# Patient Record
Sex: MALE | Employment: FULL TIME | URBAN - METROPOLITAN AREA
[De-identification: names, ages, dates, MRNs, and addresses within clinical notes are randomized per-mention and may not be internally consistent; named-entity substitution may affect disease eponyms.]

---

## 2019-05-09 ENCOUNTER — OFFICE VISIT (OUTPATIENT)
Dept: ORTHOPEDIC SURGERY | Age: 39
End: 2019-05-09

## 2019-05-09 ENCOUNTER — DOCUMENTATION ONLY (OUTPATIENT)
Dept: ORTHOPEDIC SURGERY | Age: 39
End: 2019-05-09

## 2019-05-09 VITALS
WEIGHT: 231.8 LBS | RESPIRATION RATE: 13 BRPM | OXYGEN SATURATION: 100 % | TEMPERATURE: 97 F | HEART RATE: 61 BPM | SYSTOLIC BLOOD PRESSURE: 137 MMHG | DIASTOLIC BLOOD PRESSURE: 85 MMHG | HEIGHT: 71 IN | BODY MASS INDEX: 32.45 KG/M2

## 2019-05-09 DIAGNOSIS — M22.42 CHONDROMALACIA OF PATELLOFEMORAL JOINT, LEFT: Primary | ICD-10-CM

## 2019-05-09 DIAGNOSIS — M22.41 CHONDROMALACIA OF PATELLOFEMORAL JOINT, RIGHT: ICD-10-CM

## 2019-05-09 RX ORDER — ERGOCALCIFEROL 1.25 MG/1
50000 CAPSULE ORAL
COMMUNITY

## 2019-05-09 RX ORDER — MELOXICAM 15 MG/1
15 TABLET ORAL
Qty: 30 TAB | Refills: 1 | Status: SHIPPED | OUTPATIENT
Start: 2019-05-09

## 2019-05-09 NOTE — LETTER
NOTIFICATION RETURN TO WORK / SCHOOL 
 
5/9/2019 9:51 AM 
 
[address] Ramu Bradford [address] [address] To Whom It May Concern: 
 
Omid Rivera is currently under the care of [address] [place] [address] Faraz Solis. He will return to work with the following restrictions: no running x 4 weeks. Work restrictions will be re-evaluated at his next follow up appointment. If there are questions or concerns please have the patient contact our office. Sincerely, Robbie Taylor MD

## 2019-05-09 NOTE — PROGRESS NOTES
Curtis Bath III 
1980 Chief Complaint Patient presents with  Knee Pain SEVERIANO KNEE PAIN  
  
 
HISTORY OF PRESENT ILLNESS Dhiraj Shell is a 41 y.o. male who presents today for evaluation of b/l knee pain. L>R. He rates his pain 9/10 today. Pain has been present for several months. He states he went for a run and then both of his knees swelled up. He states his heel and toes feel numb. He notes he cannot workout like he would like to. Patient describes the pain as aching and dull that is Constant in nature. Symptoms are worse with walking and standing , Activity and is better with  Rest. Associated symptoms include Swelling. Since problem started, it: has worsened. Pain does not wake patient up at night. Has taken no meds for the problem. Has tried following treatments: Injections:NO; Brace:NO; Therapy:NO; Cane/Crutch:NO No Known Allergies History reviewed. No pertinent past medical history. Social History Socioeconomic History  Marital status: UNKNOWN Spouse name: Not on file  Number of children: Not on file  Years of education: Not on file  Highest education level: Not on file Occupational History  Not on file Social Needs  Financial resource strain: Not on file  Food insecurity:  
  Worry: Not on file Inability: Not on file  Transportation needs:  
  Medical: Not on file Non-medical: Not on file Tobacco Use  Smoking status: Never Smoker  Smokeless tobacco: Never Used Substance and Sexual Activity  Alcohol use: Yes  Drug use: No  
 Sexual activity: Not on file Lifestyle  Physical activity:  
  Days per week: Not on file Minutes per session: Not on file  Stress: Not on file Relationships  Social connections:  
  Talks on phone: Not on file Gets together: Not on file Attends Islam service: Not on file Active member of club or organization: Not on file Attends meetings of clubs or organizations: Not on file Relationship status: Not on file  Intimate partner violence:  
  Fear of current or ex partner: Not on file Emotionally abused: Not on file Physically abused: Not on file Forced sexual activity: Not on file Other Topics Concern  Not on file Social History Narrative  Not on file History reviewed. No pertinent surgical history. Family History Problem Relation Age of Onset  Diabetes Father Current Outpatient Medications Medication Sig  ergocalciferol (VITAMIN D2) 50,000 unit capsule Take 50,000 Units by mouth. No current facility-administered medications for this visit. REVIEW OF SYSTEM Patient denies: Weight loss, Fever/Chills, HA, Visual changes, Fatigue, Chest pain, SOB, Abdominal pain, N/V/D/C, Blood in stool or urine, Edema. Pertinent positive as above in HPI. All others were negative PHYSICAL EXAM:  
Visit Vitals /85 Pulse 61 Temp 97 °F (36.1 °C) (Oral) Resp 13 Ht 5' 11\" (1.803 m) Wt 231 lb 12.8 oz (105.1 kg) SpO2 100% BMI 32.33 kg/m² The patient is a well-developed, well-nourished male   in no acute distress. The patient is alert and oriented times three. The patient is alert and oriented times three. Mood and affect are normal. 
LYMPHATIC: lymph nodes are not enlarged and are within normal limits SKIN: normal in color and non tender to palpation. There are no bruises or abrasions noted. NEUROLOGICAL: Motor sensory exam is within normal limits. Reflexes are equal bilaterally. There is normal sensation to pinprick and light touch MUSCULOSKELETAL: 
Examination Left knee Right knee Skin Intact Intact Range of motion 0-130 0-130 Effusion - - Medial joint line tenderness - - Lateral joint line tenderness - - Tenderness Pes Bursa - - Tenderness insertion MCL - - Tenderness insertion LCL - - Vineets - - Patella crepitus - -  
 Patella grind + + Lachman - - Pivot shift - - Anterior drawer - - Posterior drawer - - Varus stress - -  
Valgus stress - - Neurovascular Intact Intact Calf Swelling and Tenderness to Palpation - - Christy's Test - -  
Hamstring Cord Tightness + + IMAGING: XR of b/l knee dated 4/16/19 was reviewed and read:  
IMPRESSION: 
No significant abnormality identified. IMPRESSION:   
  ICD-10-CM ICD-9-CM 1. Chondromalacia of patellofemoral joint, left M22.42 717.7 REFERRAL TO PHYSICAL THERAPY  
   meloxicam (MOBIC) 15 mg tablet 2. Chondromalacia of patellofemoral joint, right M22.41 717.7 REFERRAL TO PHYSICAL THERAPY  
   meloxicam (MOBIC) 15 mg tablet PLAN: 
1. The patient presents today with b/l knee pain due to patellofemoral chondromalacia and I would like him to begin PT. Risk factors include: n/a 2. No ultrasound exam indicated today 3. No cortisone injection indicated today 4. Yes Physical Therapy indicated today 5. No diagnostic test indicated today: 6. No durable medical equipment indicated today 7. No referral indicated today 8. Yes medications indicated today: MOBIC 9. No Narcotic indicated today RTC 4 weeks if pain continues Scribed by Pasha Cary ([address]) as dictated by Saeed Marion MD 
 
I, Dr. Saeed Marion, confirm that all documentation is accurate.  
 
Saeed Marion M.D.  
[facility]

## 2019-05-09 NOTE — PROGRESS NOTES
1. Have you been to the ER, urgent care clinic since your last visit? Hospitalized since your last visit? No 
 
2. Have you seen or consulted any other health care providers outside of the [address] [place] [address] since your last visit? Include any pap smears or colon screening.  No

## 2019-05-09 NOTE — PROGRESS NOTES
Patient came to [facility] location for an appointment with  [facility]. Patient was instructed to drop off form at  to be completed. Patient states they need a detailed office note stating information that is listed on the front and the back of the document he dropped off at the . Each of these areas need to be addressed in the office note. Patient requests that the office note has all of this information listed and contact him when he can come to the office to sign a release form and  the note. Patient can be reached at [phone]. Form has been placed into the forms bin. Patient is aware of 7-10 business day completion time.

## 2019-05-16 ENCOUNTER — HOSPITAL ENCOUNTER (OUTPATIENT)
Dept: PHYSICAL THERAPY | Age: 39
End: 2019-05-16
Payer: COMMERCIAL

## 2019-05-21 ENCOUNTER — HOSPITAL ENCOUNTER (OUTPATIENT)
Dept: PHYSICAL THERAPY | Age: 39
Discharge: HOME | End: 2019-05-21
Payer: COMMERCIAL

## 2019-05-21 PROCEDURE — 97162 PT EVAL MOD COMPLEX 30 MIN: CPT

## 2019-05-21 PROCEDURE — 97110 THERAPEUTIC EXERCISES: CPT

## 2019-05-21 NOTE — PROGRESS NOTES
[facility] at the [address], [place], [address] [facility]  Phone: [phone]      Fax:  [phone]  PLAN OF CARE / [address] [place] [address] PHYSICAL THERAPY SERVICES  Patient Name: Evan Vergara III : 1980   Medical   Diagnosis: Pain in right knee [M25.561]  Left knee pain [M25.562] Treatment Diagnosis: Pain in right knee [M25.561]  Left knee pain [M25.562]   Onset Date: chronic     Referral Source: Jone Samayoa [place] [facility]): 2019   Prior Hospitalization: See medical history Provider #: [id number]   Prior Level of Function: Some pain with running   Comorbidities: none   Medications: Verified on Patient Summary List   The Plan of Care and following information is based on the information from the initial evaluation.   ========================================================================  Assessment / key information:  Patient is a 41 y.o. M who presents to [facility] with a dx of B knee pain s/p recent onset of increased exercise. Symptoms are worsened by running and alleviated by rest.  Average reported pain levels are 5/10, 9/10 at worst, and 2/10a t best. Imaging \"negative\" per patient. Pt would benefit from skilled PT to address her objective and functional limitations to improve ROM, strength, posture, and decrease pain to improve his ability to stand for prolonged at work/home.        PROM Accessory    R  L   Patellar  Hyper  Hyper    tibifemoral  wfl  wfl            ROM    R  L    Knee flex 130 130    Knee ext 0 deg  1 deg    Ankle DF  25 deg   25   Hip Flex wFL wFL   Hip IR/ER  WFL wfl       MMT    Right  Left   Hip flexion 5/5 5   Hip abd/add 5/5 5   Knee ext  5 5   Bridge  Difficulty with low height bridging         ST  Poor squatting mechanics   Whipping with gait mechanics   (-) ligamentous                 ========================================================================  Eval Complexity: History: LOW Complexity : Zero comorbidities / personal factors that will impact the outcome / POCExam:HIGH Complexity : 4+ Standardized tests and measures addressing body structure, function, activity limitation and / or participation in recreation  Presentation: MEDIUM Complexity : Evolving with changing characteristics  Clinical Decision Making:MEDIUM Complexity : FOTO score of 26-74Overall Complexity:MEDIUM  Problem List: pain affecting function, decrease ROM, decrease strength, impaired gait/ balance, decrease ADL/ functional abilitiies and decrease activity tolerance   Treatment Plan may include any combination of the following: Therapeutic exercise, Therapeutic activities, Neuromuscular re-education, Physical agent/modality, Gait/balance training, Manual therapy and Patient education  Patient / Family readiness to learn indicated by: asking questions and interest  Persons(s) to be included in education: patient (P)  Barriers to Learning/Limitations: None  Measures taken:    Patient Goal (s): \"walk normally\"   Patient self reported health status: good  Rehabilitation Potential: good   Short Term Goals: To be accomplished in  3  weeks:  Short term goals   1. Patient to be (I) and compliant with Initial HEP to prevent further disability. Eval: bridge, Sit to stand. 2. Patient to improve FOTO scores by 7 points to improve functional tolerance. Eval: 50/100       Long term Goals  1. Pt to be (I) and compliant with progressive HEP in preparation for D/C. Eval: n.a     2. Pt to increase running tolerance to a mile and half in 12 minutes to improve functional tolerance. Eval: pain with running     3. Pt to perform 10 full height single leg bridges to full height. Eval: moderat height bridging.  Long Term Goals:  To be accomplished in  6  weeks:    Frequency / Duration:   Patient to be seen  2-3  times per week for 4-6  weeks:  Patient / Caregiver education and instruction: activity modification and exercises  Therapist Signature: Danisha Rojas, PT Date: 5/21/2019     Time: 4:43 PM   ========================================================================  I certify that the above Physical Therapy Services are being furnished while the patient is under my care. I agree with the treatment plan and certify that this therapy is necessary. Physician Signature:        Date:       Time:   Please sign and return to [facility]  or you may fax the signed copy [phone] . Thank you.

## 2019-05-21 NOTE — PROGRESS NOTES
PT DAILY TREATMENT NOTE    Patient Name: Huong Urbano III  Date:2019  : 1980  [x]  Patient  Verified  Payor: Anna Jayme / Plan: Karo MORALES / Product Type: Commerical /    In time:4:40  Out time:5:30  Total Treatment Time (min):50   Total Timed Codes (min): 25  1:1 Treatment Time ( [address] only): 25  Visit #: 1 of 5    Treatment Area: Pain in right knee [M25.561]  Left knee pain [M25.562]    SUBJECTIVE  Pain Level (0-10 scale):0/10   Any medication changes, allergies to medications, adverse drug reactions, diagnosis change, or new procedure performed?: [x] No    [] Yes (see summary sheet for update)  Subjective functional status/changes:   [] No changes reported  Patient is a 41 y.o. M who presents to [facility] with a dx of B knee pain s/p recent onset of increased exercise. Symptoms are worsened by running and alleviated by rest.  Average reported pain levels are 5/10, 9/10 at worst, and 2/10a t best. Imaging \"negative\" per patient. Pt would benefit from skilled PT to address her objective and functional limitations to improve ROM, strength, posture, and decrease pain to improve his ability to stand for prolonged at work/home. OBJECTIVE    Modalities Rationale:     decrease edema, decrease inflammation and decrease pain to improve patient's ability to stand for prolonged periods.     min [] Estim, type/location:                                      []  att     []  unatt     []  w/US     []  w/ice    []  w/heat    min []  Mechanical Traction: type/lbs                   []  pro   []  sup   []  int   []  cont    []  before manual    []  after manual    min []  Ultrasound, settings/location:      min []  Iontophoresis w/ dexamethasone, location:                                               []  take home patch       []  in clinic   5 min [x]  Ice     []  Heat    location/position: Seated L knee     min []  Vasopneumatic Device, press/temp:     min []  Other:    [] Skin assessment post-treatment (if applicable):    []  intact    []  redness- no adverse reaction     []redness  adverse reaction:        20 min []Eval                  []Re-Eval       25 min Therapeutic Exercise:  [] See flow sheet :   Rationale: increase ROM and increase strength to improve the patients ability to stand for prolonged periods. With   [x] TE   [] TA   [] neuro   [] other: Patient Education: [x] Review HEP    [] Progressed/Changed HEP based on:   [] positioning   [] body mechanics   [] transfers   [] heat/ice application    [] other:      Other Objective/Functional Measures:       PROM Accessory     R  L   Patellar  Hyper  Hyper    tibifemoral  wfl  wfl                 ROM     R  L    Knee flex 130 130    Knee ext 0 deg  1 deg    Ankle DF  25 deg   25   Hip Flex wFL wFL   Hip IR/ER  WFL wfl         MMT     Right  Left   Hip flexion 5/5 5   Hip abd/add 5/5 5   Knee ext  5 5   Bridge  Difficulty with low height bridging            ST  Poor squatting mechanics   Whipping with gait mechanics   (-) ligamentous        Pain Level (0-10 scale) post treatment:1/10     ASSESSMENT/Changes in Function: Pt has signs and symptoms consistent with mechanical knee pain. Patient will continue to benefit from skilled PT services to modify and progress therapeutic interventions, address functional mobility deficits, address ROM deficits and address strength deficits to attain remaining goals. []  See Plan of Care  []  See progress note/recertification  []  See Discharge Summary         Progress towards goals / Updated goals: · Short Term Goals: To be accomplished in  3  weeks:  Short term goals   1. Patient to be (I) and compliant with Initial HEP to prevent further disability. Eval: bridge, Sit to stand.      2. Patient to improve FOTO scores by 7 points to improve functional tolerance.   Eval: 50/100         Long term Goals  1. Pt to be (I) and compliant with progressive HEP in preparation for D/C.    Eval: n.a    2. Pt to increase running tolerance to a mile and half in 12 minutes to improve functional tolerance. Eval: pain with running      3. Pt to perform 10 full height single leg bridges to full height.    Eval: moderate height bridging.        PLAN  []  Upgrade activities as tolerated     []  Continue plan of care  []  Update interventions per flow sheet       []  Discharge due to:_  []  Other:_      Wes Clements, PT 5/21/2019  6:56 PM    Future Appointments   Date Time Provider [place]   6/4/2019  3:00 PM Jazmin PURCELL [facility]   6/6/2019  8:40 AM MD Kaveh Azevedo   6/6/2019 12:45 PM ALYX Fajardo [facility]   6/10/2019  4:15 PM ALYX Fajardo [facility]   6/17/2019  4:15 PM ALYX Fajardo [facility]

## 2019-06-04 ENCOUNTER — HOSPITAL ENCOUNTER (OUTPATIENT)
Dept: PHYSICAL THERAPY | Age: 39
Discharge: HOME | End: 2019-06-04
Payer: COMMERCIAL

## 2019-06-04 PROCEDURE — 97530 THERAPEUTIC ACTIVITIES: CPT

## 2019-06-04 PROCEDURE — 97110 THERAPEUTIC EXERCISES: CPT

## 2019-06-04 NOTE — PROGRESS NOTES
PT DAILY TREATMENT NOTE    Patient Name: Rashi Escalante III  Date:2019  : 1980  [x]  Patient  Verified  Payor: Richard Bright / Plan: Joanne Ruff RPN / Product Type: Commerical /    In time:3:15  Out time:4:15  Total Treatment Time (min): 60  Total Timed Codes (min): 60  1:1 Treatment Time ( [address] only): 60   Visit #: 2 of 5    Treatment Area: Pain in right knee [M25.561]  Left knee pain [M25.562]    SUBJECTIVE  Pain Level (0-10 scale): 3  Any medication changes, allergies to medications, adverse drug reactions, diagnosis change, or new procedure performed?: [x] No    [] Yes (see summary sheet for update)  Subjective functional status/changes:   [] No changes reported  \"they are alright Not as bad as last week. Ivan Lopez \"     OBJECTIVE      45 min Therapeutic Exercise:  [x] See flow sheet :   Rationale: increase ROM, increase strength, improve coordination and improve balance to improve the patients ability to perform pain free ADL\"s   15 min Therapeutic Activity:  [x]  See flow sheet :   Rationale: increase ROM, increase strength and improve coordination  to improve the patients ability to perform pain free ADLs     With   [x] TE   [] TA   [] neuro   [] other: Patient Education: [x] Review HEP    [] Progressed/Changed HEP based on:   [] positioning   [] body mechanics   [] transfers   [] heat/ice application    [] other:      Other Objective/Functional Measures:   Pain with step ups on left  Very weak and inhibited glute max       Pain Level (0-10 scale) post treatment: 3    ASSESSMENT/Changes in Function: Pt reported continued pain with prolonged standing and sit to stand. Noted lateral left knee pain with descent of step downs . Improved with band around knee with medial pull. Pt was challenged with single leg bridges. Noted deficits in gluts and Left adductor strength with positioning . Unable to perform sit to stand wihtout pain. Focused on PPT with squatting.      Patient will continue to benefit from skilled PT services to modify and progress therapeutic interventions, address functional mobility deficits, address ROM deficits, address strength deficits, analyze and address soft tissue restrictions, analyze and cue movement patterns, analyze and modify body mechanics/ergonomics, assess and modify postural abnormalities, address imbalance/dizziness and instruct in home and community integration to attain remaining goals. []  See Plan of Care  []  See progress note/recertification  []  See Discharge Summary         Progress towards goals / Updated goals: · Short Term Goals: To be accomplished in  3  weeks:  Short term goals   1. Patient to be (I) and compliant with Initial HEP to prevent further disability.   Eval: bridge, Sit to stand.   Current: Reviewed , reported pain with sit to stand. Added clams and standing hip ABD      2. Patient to improve FOTO scores by 7 points to improve functional tolerance.   Eval: 50/100   Current : reassess at visit 5         Long term Goals  1. Pt to be (I) and compliant with progressive HEP in preparation for D/C.   Eval: n.a   Current: Reviewed and updated , progressing       2. Pt to increase running tolerance to a mile and half in 12 minutes to improve functional tolerance.   Eval: pain with running  Current:      3. Pt to perform 10 full height single leg bridges to full height.    Eval: moderate height bridging.   Current:       PLAN  [x]  Upgrade activities as tolerated     [x]  Continue plan of care  []  Update interventions per flow sheet       []  Discharge due to:_  []  Other:_      Carolina Ramos PTA 6/4/2019  3:11 PM    Future Appointments   Date Time Provider [place]   6/6/2019 12:45 PM Ej Saleh, PT JAZZ[facility]   6/10/2019  4:15 PM Ej Saleh, PT [facility]   6/17/2019  4:15 PM Ej Saleh, PT [facility]   6/18/2019  1:00 PM [facility]

## 2019-06-06 ENCOUNTER — HOSPITAL ENCOUNTER (OUTPATIENT)
Dept: PHYSICAL THERAPY | Age: 39
Discharge: HOME | End: 2019-06-06
Payer: COMMERCIAL

## 2019-06-06 PROCEDURE — 97110 THERAPEUTIC EXERCISES: CPT | Performed by: PHYSICAL THERAPIST

## 2019-06-06 PROCEDURE — 97530 THERAPEUTIC ACTIVITIES: CPT | Performed by: PHYSICAL THERAPIST

## 2019-06-06 NOTE — PROGRESS NOTES
PT DAILY TREATMENT NOTE    Patient Name: Coleen Lilly III  Date:2019  : 1980  [x]  Patient  Verified  Payor: Katie Gandhi / Plan: Afshan Brown RPN / Product Type: Commerical /    In time:1248  Out time:1400  Total Treatment Time (min): 72     Visit #: 3 of 5    Treatment Area: Pain in right knee [M25.561]  Left knee pain [M25.562]    SUBJECTIVE  Pain Level (0-10 scale): 0  Any medication changes, allergies to medications, adverse drug reactions, diagnosis change, or new procedure performed?: [x] No    [] Yes (see summary sheet for update)  Subjective functional status/changes:   [x] No changes reported      OBJECTIVE    47 min Therapeutic Exercise:  [x] See flow sheet :   Rationale: increase ROM, increase strength, improve coordination and improve balance to improve the patients ability to perform pain free ADL\"s       25 min Therapeutic Activity:  [x]  See flow sheet : pt education on running technique , symmetry of motion self assessment and repeated mobility ex for sitting trunk rotation , single leg sit to stand and sitting leg raise  , body mechanics    Rationale: increase ROM, increase strength and improve coordination  to improve the patients ability to perform pain free running             With   [] TE   [] TA   [] neuro   [] other: Patient Education: [x] Review HEP    [] Progressed/Changed HEP based on:   [] positioning   [] body mechanics   [] transfers   [] heat/ice application    [] other:      Other Objective/Functional Measures: recheck flexibility : noted tight IT band mild on right and quad R>L , HS SLR 90 deg bilaterally, tight soleous bilaterally     = leg length      Pain Level (0-10 scale) post treatment: 0     ASSESSMENT/Changes in Function: pt progressing well with exercises given handouts of ex stretches and repeated mobility ex with TMR (total motion release) technique, pt referred to pose running technique for patient education.       Patient will continue to benefit from skilled PT services to modify and progress therapeutic interventions, address functional mobility deficits, address ROM deficits, address strength deficits, analyze and address soft tissue restrictions, analyze and cue movement patterns, analyze and modify body mechanics/ergonomics, assess and modify postural abnormalities and address imbalance/dizziness to attain remaining goals. [x]  See Plan of Care  []  See progress note/recertification  []  See Discharge Summary         Progress towards goals / Updated goals: · Short Term Goals: To be accomplished in  3  weeks:  Short term goals   1. Patient to be (I) and compliant with Initial HEP to prevent further disability.   Eval: bridge, Sit to stand.   Current: Reviewed , given additional handouts on stretches and repeated mobility ex      2. Patient to improve FOTO scores by 7 points to improve functional tolerance.   Eval: 50/100   Current : reassess at visit 5      Long term Goals  1. Pt to be (I) and compliant with progressive HEP in preparation for D/C.   Eval: n.a   Current: pt compliant , Reviewed and updated , progressing       2. Pt to increase running tolerance to a mile and half in 12 minutes to improve functional tolerance.   Eval: pain with running  Current: currently no running discussed running progression starting with 2 mile walk without pain then 1/4 mile walk/run to 2 miles.      3. Pt to perform 10 full height single leg bridges to full height.    Eval: moderate height bridging.   Current: progressing     PLAN  [x]  Upgrade activities as tolerated     [x]  Continue plan of care  []  Update interventions per flow sheet       []  Discharge due to:_  []  Other:_      [place] PT 6/6/2019  1:15 PM    Future Appointments   Date Time Provider [place]   6/17/2019  4:15 PM ALYX Vigil [facility]   6/18/2019  1:00 PM MD Kaveh Reid   6/19/2019  3:00 PM ALYX Vigil [facility]

## 2019-06-07 ENCOUNTER — DOCUMENTATION ONLY (OUTPATIENT)
Dept: ORTHOPEDIC SURGERY | Age: 39
End: 2019-06-07

## 2019-06-07 NOTE — PROGRESS NOTES
Patient dropped off [employer]- fitness profiles document. He needs documentation from our office stating he is not able to run 1.5 miles in 12 mins because of his knees. He need to have how long it will take for him to get back to being able to run. Patient states he can do push ups and sit ups but cannot do high impact. Patient states this needs to be documented with how long he is to abstain from high impact.

## 2019-06-10 ENCOUNTER — APPOINTMENT (OUTPATIENT)
Dept: PHYSICAL THERAPY | Age: 39
End: 2019-06-10
Payer: COMMERCIAL

## 2019-06-17 ENCOUNTER — HOSPITAL ENCOUNTER (OUTPATIENT)
Dept: PHYSICAL THERAPY | Age: 39
Discharge: HOME | End: 2019-06-17
Payer: COMMERCIAL

## 2019-06-17 PROCEDURE — 97110 THERAPEUTIC EXERCISES: CPT

## 2019-06-17 NOTE — PROGRESS NOTES
PT DAILY TREATMENT NOTE    Patient Name: Chuck Edgar III  Date:2019  : 1980  [x]  Patient  Verified  Payor: Temo Anderson / Plan: [address] RPN / Product Type: Commerical /    In time:4:30 Out time:5:15  Total Treatment Time (min): 45     Visit #: 4 of 5    Treatment Area: Pain in right knee [M25.561]  Left knee pain [M25.562]    SUBJECTIVE  Pain Level (0-10 scale): 0  Any medication changes, allergies to medications, adverse drug reactions, diagnosis change, or new procedure performed?: [x] No    [] Yes (see summary sheet for update)  Subjective functional status/changes:   [] No changes reported  I see my doctor tomorrow. OBJECTIVE    45 min Therapeutic Exercise:  [x] See flow sheet :   Rationale: increase ROM, increase strength, improve coordination and improve balance to improve the patients ability to perform pain free ADL\"s           With   [] TE   [] TA   [] neuro   [] other: Patient Education: [x] Review HEP    [] Progressed/Changed HEP based on:   [] positioning   [] body mechanics   [] transfers   [] heat/ice application    [] other:      Other Objective/Functional Measures:Pt has improved squatting mechanics and progressed to single leg bridges. Pain Level (0-10 scale) post treatment: 0     ASSESSMENT/Changes in Function: Pt has improved squatting mechanics and improved glute control. Patient will continue to benefit from skilled PT services to modify and progress therapeutic interventions, address functional mobility deficits, address ROM deficits, address strength deficits, analyze and address soft tissue restrictions, analyze and cue movement patterns, analyze and modify body mechanics/ergonomics, assess and modify postural abnormalities and address imbalance/dizziness to attain remaining goals. [x]  See Plan of Care  []  See progress note/recertification  []  See Discharge Summary         Progress towards goals / Updated goals:   · Short Term Goals: To be accomplished in  3  weeks:  Short term goals   1. Patient to be (I) and compliant with Initial HEP to prevent further disability.   Eval: bridge, Sit to stand.   Current:MET.      2. Patient to improve FOTO scores by 7 points to improve functional tolerance.   Eval: 50/100   Current : pt reports improved functional status, and has began running in therapy, progressing.      Long term Goals  1. Pt to be (I) and compliant with progressive HEP in preparation for D/C.   Eval: n.a   Current: MET      2. Pt to increase running tolerance to a mile and half in 12 minutes to improve functional tolerance.   Eval: pain with running  Current: began running today during therapy, progressing.      3. Pt to perform 10 full height single leg bridges to full height. Eval: moderate height bridging.    Current: progressing, challenged with single leg bridging.      PLAN   [x]  Upgrade activities as tolerated     [x]  Continue plan of care  []  Update interventions per flow sheet       []  Discharge due to:_  []  Other:_      Avel Bender, ALYX 6/17/2019  1:15 PM    Future Appointments   Date Time Provider [place]   6/18/2019  1:00 PM MD Kaveh Valles   6/19/2019  3:00 PM Marilu Jama, PT [facility]

## 2019-06-17 NOTE — PROGRESS NOTES
[facility] at the [address], [place], [address] [facility]  Phone: [phone]      Fax:  [phone]    PROGRESS NOTE  Patient Name: Armando Kathleen III : 1980   Treatment/Medical Diagnosis: Pain in right knee [M25.561]  Left knee pain [M25.562]   Referral Source: Kellie Mcardle,*     Date of Initial Visit: 19 Attended Visits: 4 Missed Visits: 0     SUMMARY OF TREATMENT  Pt has improved squatting mechanics. Pt reports improvement in symptoms and has began running today in therapy. therex for strengthening, there act for functional tolerance, neuro re-ed for coordination, manual therapy to improve ROM and soft tissue mobility, and patient education for long-term management. CURRENT STATUS  Short term goals   1. Patient to be (I) and compliant with Initial HEP to prevent further disability.   Eval: bridge, Sit to stand.   Current:MET.      2. Patient to improve FOTO scores by 7 points to improve functional tolerance.   Eval: 50/100   Current : pt reports improved functional status, and has began running in therapy, progressing.      Long term Goals  1. Pt to be (I) and compliant with progressive HEP in preparation for D/C.   Eval: n.a   Current: MET      2. Pt to increase running tolerance to a mile and half in 12 minutes to improve functional tolerance.   Eval: pain with running  Current: began running today during therapy, progressing.      3. Pt to perform 10 full height single leg bridges to full height. Eval: moderate height bridging.    Current: progressing, challenged with single leg bridging. RECOMMENDATIONS  Pt to continue skilled PT for 2x/4 weeks secondary to progression towards established goals. If you have any questions/comments please contact us directly at [phone]  Thank you for allowing us to assist in the care of your patient.   Therapist Signature: Paco Gore PT Date: 2019     Time: 6:00 PM   NOTE TO PHYSICIAN:  PLEASE COMPLETE THE ORDERS BELOW AND FAX TO   [facility] . If you are unable to process this request in 24 hours please contact our office:[phone]    ___ I have read the above report and request that my patient continue as recommended.   ___ I have read the above report and request that my patient continue therapy with the following changes/special instructions:_________________________________________________________   ___ I have read the above report and request that my patient be discharged from therapy.      Physician Signature:        Date:       Time:

## 2019-06-18 ENCOUNTER — APPOINTMENT (OUTPATIENT)
Dept: PHYSICAL THERAPY | Age: 39
End: 2019-06-18
Payer: COMMERCIAL

## 2019-06-18 ENCOUNTER — OFFICE VISIT (OUTPATIENT)
Dept: ORTHOPEDIC SURGERY | Age: 39
End: 2019-06-18

## 2019-06-18 VITALS
HEIGHT: 71 IN | OXYGEN SATURATION: 99 % | SYSTOLIC BLOOD PRESSURE: 140 MMHG | TEMPERATURE: 98.1 F | HEART RATE: 72 BPM | DIASTOLIC BLOOD PRESSURE: 81 MMHG | WEIGHT: 233 LBS | BODY MASS INDEX: 32.62 KG/M2

## 2019-06-18 DIAGNOSIS — M22.42 CHONDROMALACIA OF PATELLOFEMORAL JOINT, LEFT: Primary | ICD-10-CM

## 2019-06-18 NOTE — PROGRESS NOTES
Jhony Hough III  1980   Chief Complaint   Patient presents with    Knee Pain     left, 4 days of PT        HISTORY OF PRESENT ILLNESS  Jhony Hough III is a 41 y.o. male who presents today for reevaluation of left knee pain. Patient rates pain as 0/10 today. Pain has been present for several months. He states he went for a run and then both of his knees swelled up. He states his heel and toes feel numb. He notes he cannot workout like he would like to. Pt has been to 4 days of PT with some relief. 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 Patient denies any fever, chills, chest pain, shortness of breath or calf pain. The remainder of the review of systems is negative. There are no new illness or injuries to report since last seen in the office. There are no changes to medications, allergies, family or social history. PHYSICAL EXAM:   Visit Vitals  /81 (BP 1 Location: Right arm, BP Patient Position: Sitting)   Pulse 72   Temp 98.1 °F (36.7 °C) (Oral)   Ht 5' 11\" (1.803 m)   Wt 233 lb (105.7 kg)   SpO2 99%   BMI 32.50 kg/m²     The patient is a well-developed, well-nourished male   in no acute distress. The patient is alert and oriented times three. The patient is alert and oriented times three. Mood and affect are normal.  LYMPHATIC: lymph nodes are not enlarged and are within normal limits  SKIN: normal in color and non tender to palpation. There are no bruises or abrasions noted. NEUROLOGICAL: Motor sensory exam is within normal limits. Reflexes are equal bilaterally.  There is normal sensation to pinprick and light touch  MUSCULOSKELETAL:  Examination Left knee Right knee   Skin Intact Intact   Range of motion 0-130 0-130   Effusion - -   Medial joint line tenderness - -   Lateral joint line tenderness - -   Tenderness Pes Bursa - -   Tenderness insertion MCL - -   Tenderness insertion LCL - -   Vineets - -   Patella crepitus - -   Patella grind + +   Lachman - -   Pivot shift - -   Anterior drawer - -   Posterior drawer - -   Varus stress - -   Valgus stress - -   Neurovascular Intact Intact   Calf Swelling and Tenderness to Palpation - -   Christy's Test - -   Hamstring Cord Tightness + +     IMAGING: XR of b/l knee dated 4/16/19 was reviewed and read:   IMPRESSION:  No significant abnormality identified. IMPRESSION:      ICD-10-CM ICD-9-CM    1. Chondromalacia of patellofemoral joint, left M22.42 717.7       PLAN:   1. Pt presents today with left knee pain due to chondromalacia and was given a note today with restrictions: low impact activities which will prevent him from running for next 4 months. Cleared to do pushups,situps. Risk factors include: n/a  2. No ultrasound exam indicated today  3. No cortisone injection indicated today   4. No Physical/Occupational Therapy indicated today  5. No diagnostic test indicated today:   6. No durable medical equipment indicated today  7. No referral indicated today   8. No medications indicated today:   9. No Narcotic indicated today       RTC prn      Scribed by [address]) as dictated by Renetta Quiroz MD    I, Dr. Renetta Quiroz, confirm that all documentation is accurate.     Renetta Quiroz M.D.   [facility]

## 2019-06-18 NOTE — LETTER
NOTIFICATION RETURN TO WORK / SCHOOL 
 
6/18/2019 1:35 PM 
 
Mr. Brenda Lozoya [address] [facility] [address] To Whom It May Concern: 
 
Elizabeth Marie is currently under the care of [address] [place] [address] Faraz Solis. He is cleared for low impact activities, which will prevent him from running for next 4 months. Cleared to do pushups and situps. If there are questions or concerns please have the patient contact our office. Sincerely, Annamaria Crabtree MD

## 2019-06-19 ENCOUNTER — HOSPITAL ENCOUNTER (OUTPATIENT)
Dept: PHYSICAL THERAPY | Age: 39
Discharge: HOME | End: 2019-06-19
Payer: COMMERCIAL

## 2019-06-19 PROCEDURE — 97110 THERAPEUTIC EXERCISES: CPT

## 2019-06-19 PROCEDURE — 97140 MANUAL THERAPY 1/> REGIONS: CPT

## 2019-06-19 NOTE — PROGRESS NOTES
PT DAILY TREATMENT NOTE    Patient Name: Андрей Rodriguez III  Date:2019  : 1980  [x]  Patient  Verified  Payor: [address] [place] [address] Road / Plan: Fannie Tan RPN / Product Type: Commerical /    In time:3:25  Out time:4:25  Total Treatment Time (min): 60     Visit #: 5 of 5    Treatment Area: Pain in right knee [M25.561]  Left knee pain [M25.562]    SUBJECTIVE  Pain Level (0-10 scale): 0  Any medication changes, allergies to medications, adverse drug reactions, diagnosis change, or new procedure performed?: [x] No    [] Yes (see summary sheet for update)  Subjective functional status/changes:   [] No changes reported  My doctor said no high impact activities. OBJECTIVE    50 min Therapeutic Exercise:  [x] See flow sheet :   Rationale: increase ROM, increase strength, improve coordination and improve balance to improve the patients ability to perform pain free ADL\"s     10 min Manual Therapy:  STM HS   Rationale: decrease pain, increase ROM and increase tissue extensibility to stand for prolonged periods. With   [] TE   [] TA   [] neuro   [] other: Patient Education: [x] Review HEP    [] Progressed/Changed HEP based on:   [] positioning   [] body mechanics   [] transfers   [] heat/ice application    [] other:      Other Objective/Functional Measures: Pt has dificulty with low squatting. Pt still has deficits in HS strength. Pt has gait deviations during running. Pain Level (0-10 scale) post treatment: 0     ASSESSMENT/Changes in Function:   Pt has dificulty with low squatting. Pt still has deficits in HS strength. Pt has gait deviations during running.        Patient will continue to benefit from skilled PT services to modify and progress therapeutic interventions, address functional mobility deficits, address ROM deficits, address strength deficits, analyze and address soft tissue restrictions, analyze and cue movement patterns, analyze and modify body mechanics/ergonomics, assess and modify postural abnormalities and address imbalance/dizziness to attain remaining goals. [x]  See Plan of Care  []  See progress note/recertification  []  See Discharge Summary         Progress towards goals / Updated goals: · Short Term Goals: To be accomplished in  3  weeks:  Short term goals   1. Patient to be (I) and compliant with Initial HEP to prevent further disability.   Eval: bridge, Sit to stand.   Current:MET.      2. Patient to improve FOTO scores by 7 points to improve functional tolerance.   Eval: 50/100   Current : pt reports improved functional status, and has began running in therapy, progressing.      Long term Goals  1. Pt to be (I) and compliant with progressive HEP in preparation for D/C.   Eval: n.a   Current: MET      2. Pt to increase running tolerance to a mile and half in 12 minutes to improve functional tolerance.   Eval: pain with running  Current: began running today during therapy, progressing.      3. Pt to perform 10 full height single leg bridges to full height. Eval: moderate height bridging.    Current: progressing, challenged with single leg bridging.      PLAN   [x]  Upgrade activities as tolerated     [x]  Continue plan of care  []  Update interventions per flow sheet       []  Discharge due to:_  []  Other:_      Bernardino Gonzales, PT 6/19/2019  1:15 PM    Future Appointments   Date Time Provider [place]   6/19/2019  3:00 PM Malka Pelaez, PT [facility]

## 2019-09-04 ENCOUNTER — HOSPITAL ENCOUNTER (OUTPATIENT)
Dept: PHYSICAL THERAPY | Age: 39
Discharge: HOME | End: 2019-09-04
Payer: COMMERCIAL

## 2019-09-04 PROCEDURE — 97110 THERAPEUTIC EXERCISES: CPT

## 2019-09-04 NOTE — PROGRESS NOTES
PT DAILY TREATMENT NOTE    Patient Name: Frederick Abdalla III  Date:2019  : 1980  [x]  Patient  Verified  Payor: Yosi Carrasquillo / Plan: Aki Macias RPN / Product Type: Commerical /    In time:10:49  Out time:11:40  Total Treatment Time (min): 51     Visit #: 6 of 10    Treatment Area: Pain in right knee [M25.561]  Left knee pain [M25.562]    SUBJECTIVE  Pain Level (0-10 scale): 0  Any medication changes, allergies to medications, adverse drug reactions, diagnosis change, or new procedure performed?: [x] No    [] Yes (see summary sheet for update)  Subjective functional status/changes:   [] No changes reported  My doctor said no high impact activities. OBJECTIVE    51 min Therapeutic Exercise:  [x] See flow sheet :   Rationale: increase ROM, increase strength, improve coordination and improve balance to improve the patients ability to perform pain free ADL\"s             With   [] TE   [] TA   [] neuro   [] other: Patient Education: [x] Review HEP    [] Progressed/Changed HEP based on:   [] positioning   [] body mechanics   [] transfers   [] heat/ice application    [] other:      Other Objective/Functional Measures: Pt has improved squatting mechanics. Pain Level (0-10 scale) post treatment: 0     ASSESSMENT/Changes in Function:   Pt has returned to PT after 2 month break secondary to [employer] obligations. Pt has deficits in HS strength, decreased medial/lateral stability during dynamic activities. Pt very challenged with eccentric hamstring, quad, and single leg balance. Pt challenged with end range TKE and is still unable to run at this time secondary to MD recommendations. Pt is improving and is compliant with HEP. Pt to benefit from continued skilled PT to prevent injury and safe return to running.        Patient will continue to benefit from skilled PT services to modify and progress therapeutic interventions, address functional mobility deficits, address ROM deficits, address strength deficits, analyze and address soft tissue restrictions, analyze and cue movement patterns, analyze and modify body mechanics/ergonomics, assess and modify postural abnormalities and address imbalance/dizziness to attain remaining goals. [x]  See Plan of Care  []  See progress note/recertification  []  See Discharge Summary         Progress towards goals / Updated goals: · Short Term Goals: To be accomplished in  3  weeks:  Short term goals   1. Patient to be (I) and compliant with Initial HEP to prevent further disability.   Eval: bridge, Sit to stand.   Current:MET.      2. Patient to improve FOTO scores by 7 points to improve functional tolerance.   Eval: 50/100   Last PN : pt reports improved functional status, and has began running in therapy   Current: Pt unable to run per MD, progressing. Long term Goals  1. Pt to be (I) and compliant with progressive HEP in preparation for D/C.   Eval: n.a   Current: MET      2. Pt to increase running tolerance to a mile and half in 12 minutes to improve functional tolerance.   Eval: pain with running  Current: began running today during therapy, progressing.      3. Pt to perform 10 full height single leg bridges to full height. Eval: moderate height bridging.    Last PNt: challenged with single leg bridging. Current: challenged but improved single leg bridging, progressing. PLAN   [x]  Upgrade activities as tolerated     [x]  Continue plan of care  []  Update interventions per flow sheet       []  Discharge due to:_  []  Other:_      Sloan Mitchell, PT 9/4/2019  1:15 PM    No future appointments.

## 2019-09-04 NOTE — PROGRESS NOTES
[facility] at the [address], [place], [address] [facility] [address]  Phone: [phone]      Fax:  [phone]    PROGRESS NOTE  Patient Name: Jamal Segundo III : 1980   Treatment/Medical Diagnosis: Pain in right knee [M25.561]  Left knee pain [M25.562]   Referral Source: Brian Lopez,*     Date of Initial Visit: 19 Attended Visits: 6 Missed Visits: 0     SUMMARY OF TREATMENT  Pt has returned to PT after 2 month break secondary to [employer] obligations. Pt has deficits in HS strength, decreased medial/lateral stability during dynamic activities. Pt very challenged with eccentric hamstring, quad, and single leg balance. Pt challenged with end range TKE and is still unable to run at this time secondary to MD recommendations. Pt is improving and is compliant with HEP. Pt to benefit from continued skilled PT to prevent injury and safe return to running    therex for strengthening, there act for functional tolerance, neuro re-ed for coordination, manual therapy to improve ROM and soft tissue mobility, and patient education for long-term management. CURRENT STATUS  · Short Term Goals: To be accomplished in  3  weeks:  Short term goals   1. Patient to be (I) and compliant with Initial HEP to prevent further disability.   Eval: bridge, Sit to stand.   Current:MET.      2. Patient to improve FOTO scores by 7 points to improve functional tolerance.   Eval: 50/100   Last PN : pt reports improved functional status, and has began running in therapy   Current: Pt unable to run per MD, progressing. Long term Goals  1. Pt to be (I) and compliant with progressive HEP in preparation for D/C.   Eval: n.a   Current: MET      2. Pt to increase running tolerance to a mile and half in 12 minutes to improve functional tolerance.   Eval: pain with running  Current: began running today during therapy, progressing.      3.  Pt to perform 10 full height single leg bridges to full height. Eval: moderate height bridging.    Last PNt: challenged with single leg bridging. Current: challenged but improved single leg bridging, progressing      RECOMMENDATIONS  Pt to continue skilled PT for 5 visits secondary to progression towards established goals. If you have any questions/comments please contact us directly at [phone]  Thank you for allowing us to assist in the care of your patient. Therapist Signature: Florencia Mathew PT Date: 9/4/2019     Time: 11:59 AM   NOTE TO PHYSICIAN:  PLEASE COMPLETE THE ORDERS BELOW AND FAX TO   [facility] . If you are unable to process this request in 24 hours please contact our office:[phone]    ___ I have read the above report and request that my patient continue as recommended.   ___ I have read the above report and request that my patient continue therapy with the following changes/special instructions:_________________________________________________________   ___ I have read the above report and request that my patient be discharged from therapy.      Physician Signature:        Date:       Time:

## 2019-09-04 NOTE — PROGRESS NOTES
PT DAILY TREATMENT NOTE    Patient Name: Ashley Lucero III  Date:2019  : 1980  [x]  Patient  Verified  Payor: Pamela Chandler / Plan: Bud MORALES / Product Type: Commerical /    In time:3:25  Out time:4:25  Total Treatment Time (min): 60     Visit #: 6 of 10    Treatment Area: Pain in right knee [M25.561]  Left knee pain [M25.562]    SUBJECTIVE  Pain Level (0-10 scale): 0  Any medication changes, allergies to medications, adverse drug reactions, diagnosis change, or new procedure performed?: [x] No    [] Yes (see summary sheet for update)  Subjective functional status/changes:   [] No changes reported  My doctor said no high impact activities. OBJECTIVE    50 min Therapeutic Exercise:  [x] See flow sheet :   Rationale: increase ROM, increase strength, improve coordination and improve balance to improve the patients ability to perform pain free ADL\"s     10 min Manual Therapy:  STM HS   Rationale: decrease pain, increase ROM and increase tissue extensibility to stand for prolonged periods. With   [] TE   [] TA   [] neuro   [] other: Patient Education: [x] Review HEP    [] Progressed/Changed HEP based on:   [] positioning   [] body mechanics   [] transfers   [] heat/ice application    [] other:      Other Objective/Functional Measures: Pt has dificulty with low squatting. Pt still has deficits in HS strength. Pt has gait deviations during running. Pain Level (0-10 scale) post treatment: 0     ASSESSMENT/Changes in Function:   Pt has dificulty with low squatting. Pt still has deficits in HS strength. Pt has gait deviations during running.        Patient will continue to benefit from skilled PT services to modify and progress therapeutic interventions, address functional mobility deficits, address ROM deficits, address strength deficits, analyze and address soft tissue restrictions, analyze and cue movement patterns, analyze and modify body mechanics/ergonomics, assess and modify postural abnormalities and address imbalance/dizziness to attain remaining goals. [x]  See Plan of Care  []  See progress note/recertification  []  See Discharge Summary         Progress towards goals / Updated goals: · Short Term Goals: To be accomplished in  3  weeks:  Short term goals   1. Patient to be (I) and compliant with Initial HEP to prevent further disability.   Eval: bridge, Sit to stand.   Current:MET.      2. Patient to improve FOTO scores by 7 points to improve functional tolerance.   Eval: 50/100   Current : pt reports improved functional status, and has began running in therapy, progressing.      Long term Goals  1. Pt to be (I) and compliant with progressive HEP in preparation for D/C.   Eval: n.a   Current: MET      2. Pt to increase running tolerance to a mile and half in 12 minutes to improve functional tolerance.   Eval: pain with running  Current: began running today during therapy, progressing.      3. Pt to perform 10 full height single leg bridges to full height. Eval: moderate height bridging.    Current: progressing, challenged with single leg bridging. PLAN   [x]  Upgrade activities as tolerated     [x]  Continue plan of care  []  Update interventions per flow sheet       []  Discharge due to:_  []  Other:_      Melody Gates, PT 9/4/2019  1:15 PM    No future appointments.

## 2019-09-09 ENCOUNTER — APPOINTMENT (OUTPATIENT)
Dept: PHYSICAL THERAPY | Age: 39
End: 2019-09-09
Payer: COMMERCIAL

## 2019-09-11 ENCOUNTER — HOSPITAL ENCOUNTER (OUTPATIENT)
Dept: PHYSICAL THERAPY | Age: 39
Discharge: HOME | End: 2019-09-11
Payer: COMMERCIAL

## 2019-09-11 PROCEDURE — 97110 THERAPEUTIC EXERCISES: CPT

## 2019-09-11 NOTE — PROGRESS NOTES
PT DAILY TREATMENT NOTE    Patient Name: Moriah Nielson III  Date:2019  : 1980  [x]  Patient  Verified  Payor: Nai Llanes / Plan: Josh Oliver RPN / Product Type: Commerical /    In time:5:20  Out time:6:05  Total Treatment Time (min): 45  Total Timed Codes (min): 45  1:1 Treatment Time ( only): NA   Visit #: 7 of 11    Treatment Area: Pain in right knee [M25.561]  Left knee pain [M25.562]    SUBJECTIVE  Pain Level (0-10 scale): 2/10  Any medication changes, allergies to medications, adverse drug reactions, diagnosis change, or new procedure performed?: [x] No    [] Yes (see summary sheet for update)  Subjective functional status/changes:   [] No   \"I don't have any pain right now really, more soreness. \"    OBJECTIVE    45 min Therapeutic Exercise:  [x] See flow sheet :   Rationale: increase ROM, increase strength and improve coordination to improve the patients ability to return to prior level of physical activity. With   [] TE   [] TA   [] neuro   [] other: Patient Education: [x] Review HEP    [] Progressed/Changed HEP based on:   [] positioning   [] body mechanics   [] transfers   [] heat/ice application    [] other:      Other Objective/Functional Measures:      Pain Level (0-10 scale) post treatment: 0/10    ASSESSMENT/Changes in Function: Pt tolerated session well. Pt able to perform light jog on TM for warm-up with no increased pain. Pt given return to run program to begin at home along with PT sessions. Patient will continue to benefit from skilled PT services to modify and progress therapeutic interventions, address functional mobility deficits, address ROM deficits, address strength deficits, analyze and address soft tissue restrictions, analyze and cue movement patterns, analyze and modify body mechanics/ergonomics and assess and modify postural abnormalities to attain remaining goals.      []  See Plan of Care  []  See progress note/recertification  []  See Discharge Summary Progress towards goals / Updated goals: · Short Term Goals: To be accomplished in  3  weeks:  Short term goals   1. Patient to be (I) and compliant with Initial HEP to prevent further disability.   Eval: bridge, Sit to stand.   Current: Pt given Walk to Run program as part of HEP 9/11/19     2. Patient to improve FOTO scores by 7 points to improve functional tolerance.   Eval: 50/100   Last PN : pt reports improved functional status, and has began running in therapy   Current: Pt unable to run per MD, progressing.   Long term Goals  1. Pt to be (I) and compliant with progressive HEP in preparation for D/C.   Eval: n.a   Current: MET      2. Pt to increase running tolerance to a mile and half in 12 minutes to improve functional tolerance.   Eval: pain with running  Current: began running today during therapy, progressing.      3. Pt to perform 10 full height single leg bridges to full height.    Eval: moderate height bridging.    Last PNt: challenged with single leg bridging.   Current: challenged but improved single leg bridging, progressing       PLAN  [x]  Upgrade activities as tolerated     [x]  Continue plan of care  []  Update interventions per flow sheet       []  Discharge due to:_  []  Other:_      Marcy Yanez, HILTON 9/11/2019  5:32 PM    Future Appointments   Date Time Provider [place]   9/30/2019 11:00 AM ALYX [facility]

## 2019-09-30 ENCOUNTER — HOSPITAL ENCOUNTER (OUTPATIENT)
Dept: PHYSICAL THERAPY | Age: 39
End: 2019-09-30
Payer: COMMERCIAL

## 2019-09-30 NOTE — PROGRESS NOTES
[facility] at the [address], [place], [address] [facility]  Phone: [phone]      Fax:  [phone]  DISCHARGE SUMMARY  Patient Name: Justo Williamson III : 1980   Treatment/Medical Diagnosis: Pain in right knee [M25.561]  Left knee pain [M25.562]   Referral Source: Unique Cardenas,*     Date of Initial Visit: 19 Attended Visits: 7 Missed Visits: 2     SUMMARY OF TREATMENT  Pt has made good progress towards goals. Pt has begun walk to run program and is to be d/c'd at this time. CURRENT STATUS  · Short Term Goals: To be accomplished in  3  weeks:  Short term goals   1. Patient to be (I) and compliant with Initial HEP to prevent further disability.   Eval: bridge, Sit to stand.   Current: Pt given Walk to Run program as part of HEP 19     2. Patient to improve FOTO scores by 7 points to improve functional tolerance.   Eval: 50/100   Last PN : pt reports improved functional status, and has began running in therapy   Current: Pt began walk to run program, progressing. Long term Goals  1. Pt to be (I) and compliant with progressive HEP in preparation for D/C.   Eval: n.a   Current: MET      2. Pt to increase running tolerance to a mile and half in 12 minutes to improve functional tolerance.   Eval: pain with running  Current: began running today during therapy, progressing.      3. Pt to perform 10 full height single leg bridges to full height. Eval: moderate height bridging.    Last PNt: challenged with single leg bridging.   Current: challenged but improved single leg bridging, progressing     RECOMMENDATIONS  Discontinue therapy. Progressing towards or have reached established goals. If you have any questions/comments please contact us directly at  [phone]   Thank you for allowing us to assist in the care of your patient.   Therapist Signature: Lawyer Roly PT Date: 19     Time: 2:42 PM